# Patient Record
(demographics unavailable — no encounter records)

---

## 2025-02-27 NOTE — DISCUSSION/SUMMARY
[FreeTextEntry1] : Reviewed normal lab work from last visit   Health counseling.   Offered to call home regarding unsigned PSAL forms.   Pt states parent did not check off yes or no that it was him.  Therefore advised pt to get it signed by parent and rtc tomorrow for forms completion.

## 2025-02-27 NOTE — PHYSICAL EXAM
[General Appearance - Alert] : alert [General Appearance - Well Developed] : interactive [Appearance Of Head] : the head was normocephalic [Evidence Of Head Injury] : threre was no evidence of injury [Sclera] : the sclera and conjunctiva were normal [] : no respiratory distress [Abnormal Walk] : normal gait [Nail Clubbing] : no clubbing  or cyanosis of the fingernails [Initial Inspection: Infant Active And Alert] : active and alert [Demonstrated Behavior - Infant Nonreactive To Parents] : interactive

## 2025-02-28 NOTE — DISCUSSION/SUMMARY
[FreeTextEntry1] : Sports clearance: consent form signed by parent; reviewed and completed by provider; cleared for sports  discussed importance of proper safety equipment for particular sport, sunscreen if outdoor sports discussed healthy diet/ exercise discussed personal/ family history/ risk factors no pertinent personal/ family cardiac hx or otherwise that would affect pt's ability to participate in sports

## 2025-02-28 NOTE — HISTORY OF PRESENT ILLNESS
[FreeTextEntry6] : pt here for sports clearance; was seen in office 9/27/2024 for physical;  has brought in  signed PSAL consent form today; no current complaints